# Patient Record
Sex: FEMALE | Race: WHITE | Employment: OTHER | URBAN - METROPOLITAN AREA
[De-identification: names, ages, dates, MRNs, and addresses within clinical notes are randomized per-mention and may not be internally consistent; named-entity substitution may affect disease eponyms.]

---

## 2018-05-04 ENCOUNTER — APPOINTMENT (OUTPATIENT)
Dept: PHYSICAL THERAPY | Age: 73
End: 2018-05-04

## 2018-05-09 ENCOUNTER — HOSPITAL ENCOUNTER (OUTPATIENT)
Dept: PHYSICAL THERAPY | Age: 73
Discharge: HOME | End: 2018-05-09
Payer: MEDICARE

## 2018-05-09 PROCEDURE — G8978 MOBILITY CURRENT STATUS: HCPCS | Performed by: PHYSICAL THERAPIST

## 2018-05-09 PROCEDURE — G8979 MOBILITY GOAL STATUS: HCPCS | Performed by: PHYSICAL THERAPIST

## 2018-05-09 PROCEDURE — 97161 PT EVAL LOW COMPLEX 20 MIN: CPT | Performed by: PHYSICAL THERAPIST

## 2018-05-09 NOTE — PROGRESS NOTES
..[facility] PHYSICAL THERAPY  [address] [place] [address], [place] [address],[facility], [address] [facility] - Phone: [phone]  Fax: 08-13-97-03 OF CARE / [address] [facility]  Patient Name: Andrea Roberts : 1945   Medical   Diagnosis: Back pain Treatment Diagnosis: Low back pain [M54.5]   Onset Date: x2 months     Referral Source: Linda Leon, * Start of Care [facility]): 2018   Prior Hospitalization: See medical history Provider #: [id number]   Prior Level of Function: No limitations related to back    Comorbidities: Arthritis, high blood pressure, scoliosis, thyroid problems   Medications: Verified on Patient Summary List   The Plan of Care and following information is based on the information from the initial evaluation.   ===========================================================================================  Assessment / key information:  67 F arrives to clinic with c/o R posterior hip pain radiating to anterior thigh. Reports insidious onset approximately 2 months ago that responded well to prednisone and muscle relaxer. Currently rates pain 8/10 at worst with walking and 0/10 with sitting. Denies red flags including bowel/bladder issues, night pain, etc. Recent xray show arthritis. Objective findings: ls arom ext, B sb limited 25% (R sided pain at end range), -slump, slr or SI cluster test B, -FADDIR and scower test of hip, L hip ir 4, R 18, L hip ext 9, R 22 reduced glute med and max strength B, unable to actively contract transverse abdominal or multifidi. pivm reduction in l1-5 pa, RR mobility with increased paraspinal and R QL tightness. Sx consistent with dx with likely l3/4/5 nerve involvement.  Will attempt PT in order to address problem list below in order to restore pt overall function.   ===========================================================================================  Eval Complexity: History MEDIUM Complexity : 1-2 comorbidities / personal factors will impact the outcome/ POC ;  Examination  MEDIUM Complexity : 3 Standardized tests and measures addressing body structure, function, activity limitation and / or participation in recreation ; Presentation LOW Complexity : Stable, uncomplicated ;  Decision Making MEDIUM Complexity : FOTO score of 26-74; Overall Complexity LOW   Problem List: pain affecting function, decrease ROM, decrease strength, impaired gait/ balance, decrease ADL/ functional abilitiies, decrease activity tolerance, decrease flexibility/ joint mobility and decrease transfer abilities   Treatment Plan may include any combination of the following: Therapeutic exercise, Therapeutic activities, Neuromuscular re-education, Physical agent/modality, Gait/balance training, Manual therapy, Patient education, Self Care training, Functional mobility training and Stair training  Patient / Family readiness to learn indicated by: asking questions, trying to perform skills and interest  Persons(s) to be included in education: patient (P)  Barriers to Learning/Limitations: None  Measures taken:    Patient Goal (s): Reduce pain, walk normal   Patient self reported health status: good  Rehabilitation Potential: good   Short Term Goals: To be accomplished in  2  weeks:  1. Pt will be compliant with hep  2. Pt will be able to actively contract transverse abdominal and multifidi in order to improve axial stability during adls   3. Pt will note daily max pain </=6/10 in order to increase participation in [employer]: To be accomplished in  4  weeks:  1. Pt will increase FOTO by 16 points in order to show functional improvement  2. Pt will ambulate community distances with symmetrical gait pattern  3.  Pt will be able to self manage sx in order to prepare for dc  Frequency / Duration:   Patient to be seen  2-3  times per week for 4  weeks:  Patient / Caregiver education and instruction: self care, activity modification and exercises  G-Codes (GP): Minor Ronde .Mobility [id number] Current  CK= 40-59%   Goal  CJ= 20-39%. The severity rating is based on the FOTO Score  Therapist Signature: Travis Julien DPT [facility]  Date: 0/2/9963   Certification Period: 5/9/18-8/7/18 Time: 12:51 PM   ===========================================================================================  I certify that the above Physical Therapy Services are being furnished while the patient is under my care. I agree with the treatment plan and certify that this therapy is necessary. Physician Signature:        Date:       Time:     Please sign and return to [facility], [facility]. or you may fax the signed copy to [phone]. Thank you.

## 2018-05-09 NOTE — PROGRESS NOTES
[facility] PHYSICAL THERAPY - DAILY TREATMENT NOTE    Patient Name: Marquis Armenta        Date: 2018  : 1945   yes Patient  Verified  Visit #:   1     Insurance: Payor: Alise Hough / Plan: [place] MEDICARE PART A & B / Product Type: Medicare /      In time: 1100 Out time: 1140   Total Treatment Time: 40     Medicare Time Tracking (below)   Total Timed Codes (min):  0 1:1 Treatment Time:  0     TREATMENT AREA =  Low back pain [M54.5]    SUBJECTIVE  Pain Level (on 0 to 10 scale):  0  / 10   Medication Changes/New allergies or changes in medical history, any new surgeries or procedures?    no  If yes, update Summary List   Subjective Functional Status/Changes:  []  No changes reported     See ie          OBJECTIVE           min Patient Education:  yes  Reviewed HEP   []  Progressed/Changed HEP based on: Other Objective/Functional Measures:    Demo hep without an increase in pain  See ie     Post Treatment Pain Level (on 0 to 10) scale:   0  / 10     ASSESSMENT  Assessment/Changes in Function:     See ie     []  See Progress Note/Recertification   Patient will continue to benefit from skilled PT services to modify and progress therapeutic interventions, address functional mobility deficits, address ROM deficits, address strength deficits, analyze and address soft tissue restrictions, analyze and cue movement patterns, analyze and modify body mechanics/ergonomics, assess and modify postural abnormalities and instruct in home and community integration to attain remaining goals.    Progress toward goals / Updated goals:    See ie     PLAN  []  Upgrade activities as tolerated yes Continue plan of care   []  Discharge due to :    []  Other:      Therapist: Padma Almazan PT    Date: 2018 Time: 12:50 PM     Future Appointments  Date Time Provider Yariel Ricci   5/10/2018 10:30 AM Padma Almazan PT [facility]   2018 11:00 AM Skyler Baer, ABDIRAHMAN [facility]   2018 11:00 AM Skyler Baer, PTA [facility]   5/21/2018 11:00 AM Ren Peralta, PT [facility]   5/24/2018 12:00 PM Ren Peralta, PT [facility]   5/29/2018 10:30 AM Suzan Gomez, PT [facility]   5/31/2018 11:30 AM Sergei Barnard, PTA [facility]   6/5/2018 11:00 AM Ren Peralta, PT [facility]   6/7/2018 10:30 AM Ren Peralta, PT [facility]

## 2018-05-10 ENCOUNTER — HOSPITAL ENCOUNTER (OUTPATIENT)
Dept: PHYSICAL THERAPY | Age: 73
Discharge: HOME | End: 2018-05-10
Payer: MEDICARE

## 2018-05-10 PROCEDURE — 97140 MANUAL THERAPY 1/> REGIONS: CPT | Performed by: PHYSICAL THERAPIST

## 2018-05-10 NOTE — PROGRESS NOTES
Shira Shah   PHYSICAL THERAPY - DAILY TREATMENT NOTE    Patient Name: Yakov Mehta        Date: 5/10/2018  : 1945   yes Patient  Verified  Visit #:   2     Insurance: Payor: VA MEDICARE / Plan: VA MEDICARE PART A & B / Product Type: Medicare /      In time: 6425 Out time: 8585   Total Treatment Time: 54     Medicare Time Tracking (below)   Total Timed Codes (min):  45 1:1 Treatment Time:  20     TREATMENT AREA =  Low back pain [M54.5]    SUBJECTIVE  Pain Level (on 0 to 10 scale):  4  / 10   Medication Changes/New allergies or changes in medical history, any new surgeries or procedures?    no  If yes, update Summary List   Subjective Functional Status/Changes:  []  No changes reported     I am just a little sore in my low back and I think that is from just moving in positions I have not done in a long time          OBJECTIVE  Modalities Rationale:     decrease pain and increase tissue extensibility to improve patient's ability to complete adls   min [] Estim, type/location:                                      []  att     []  unatt     []  w/US     []  w/ice    []  w/heat    min []  Mechanical Traction: type/lbs                   []  pro   []  sup   []  int   []  cont    []  before manual    []  after manual    min []  Ultrasound, settings/location:      min []  Iontophoresis w/ dexamethasone, location:                                               []  take home patch       []  in clinic   10 min []  Ice     [x]  Heat    location/position: Supine with bolster     min []  Vasopneumatic Device, press/temp:     min []  Other:    [x] Skin assessment post-treatment (if applicable):    []  intact    [x]  redness- no adverse reaction     []redness  adverse reaction:        25 min Therapeutic Exercise:  [x]  See flow sheet   Rationale:      increase ROM and increase strength to improve the patients ability to complete adls     20 min Manual Therapy: stm ls paraspinals, grade ii pa mobs l3-5, L hip ir/er stretch, prone quad stretch   Rationale:      decrease pain, increase ROM, increase tissue extensibility and decrease trigger points to improve patient's ability to complete adls         min Patient Education:  yes  Reviewed HEP   []  Progressed/Changed HEP based on: Other Objective/Functional Measures:  1:1 mt only  ttp along R ls paraspinals and QL  Required tcs to perform tra 3 way correctly without extremity compensation      Post Treatment Pain Level (on 0 to 10) scale:   1  / 10     ASSESSMENT  Assessment/Changes in Function:     No increase in pain following initial session     []  See Progress Note/Recertification   Patient will continue to benefit from skilled PT services to modify and progress therapeutic interventions, address functional mobility deficits, address ROM deficits, address strength deficits, analyze and address soft tissue restrictions, analyze and cue movement patterns, analyze and modify body mechanics/ergonomics, assess and modify postural abnormalities and instruct in home and community integration to attain remaining goals.    Progress toward goals / Updated goals:    Compliant with hep     PLAN  []  Upgrade activities as tolerated yes Continue plan of care   []  Discharge due to :    []  Other:      Therapist: Igor Rivera PT    Date: 5/10/2018 Time: 9:13 AM     Future Appointments  Date Time Provider Yariel Ricci   5/10/2018 10:30 AM Igor Rivera, PT [facility]   5/14/2018 11:00 AM Kedar Rodriguez PTA [facility]   5/16/2018 11:00 AM Kedar Rodriguez PTA [facility]   5/21/2018 11:00 AM Igor Rivera, PT [facility]   5/24/2018 12:00 PM Igor Rivera, PT [facility]   5/29/2018 10:30 AM Garcia Shin, PT [facility]   5/31/2018 11:30 AM Kedar Rodriguez PTA [facility]   6/5/2018 11:00 AM Igor Rivera, PT [facility]   6/7/2018 10:30 AM Igor Rivera, PT [facility]

## 2018-05-14 ENCOUNTER — APPOINTMENT (OUTPATIENT)
Dept: PHYSICAL THERAPY | Age: 73
End: 2018-05-14
Payer: MEDICARE

## 2018-05-15 ENCOUNTER — HOSPITAL ENCOUNTER (OUTPATIENT)
Dept: PHYSICAL THERAPY | Age: 73
Discharge: HOME | End: 2018-05-15
Payer: MEDICARE

## 2018-05-15 PROCEDURE — 97140 MANUAL THERAPY 1/> REGIONS: CPT | Performed by: PHYSICAL THERAPIST

## 2018-05-15 PROCEDURE — 97110 THERAPEUTIC EXERCISES: CPT | Performed by: PHYSICAL THERAPIST

## 2018-05-15 NOTE — PROGRESS NOTES
Melanie Rajan PHYSICAL THERAPY - DAILY TREATMENT NOTE    Patient Name: Imer Blood        Date: 5/15/2018  : 1945   yes Patient  Verified  Visit #:   3     Insurance: Payor: Mckinley Fraction / Plan: [place] MEDICARE PART A & B / Product Type: Medicare /      In time: 5378 Out time: 8233   Total Treatment Time: 43     Medicare Time Tracking (below)   Total Timed Codes (min):  43 1:1 Treatment Time:  43     TREATMENT AREA =  Low back pain [M54.5]    SUBJECTIVE  Pain Level (on 0 to 10 scale):  2-3  / 10   Medication Changes/New allergies or changes in medical history, any new surgeries or procedures?    no  If yes, update Summary List   Subjective Functional Status/Changes:  []  No changes reported     So this is a weird but my thigh pain is not there but I am starting to feel more back pain. I am also feeling some discomfort with that one stretch where I rock my knees side to side          OBJECTIVE      28 min Therapeutic Exercise:  [x]  See flow sheet   Rationale:      increase ROM and increase strength to improve the patients ability to complete adls     15 min Manual Therapy: stm ls paraspinals, grade ii pa mobs l3-5, R hip ir/er stretch, prone quad stretch, sacral L rot   Rationale:      decrease pain, increase ROM, increase tissue extensibility and decrease trigger points to improve patient's ability to complete adls         min Patient Education:  yes  Reviewed HEP   []  Progressed/Changed HEP based on:        Other Objective/Functional Measures:     Pt performing ltg with excessive range on floor - advised to modify range and perform on bed and demo in clinic without pain  Reviewed at length that reduced thigh pain could be peripheralization and increased in back pain could be contributed to increase te and mt to low back area    Post Treatment Pain Level (on 0 to 10) scale:   1  / 10     ASSESSMENT  Assessment/Changes in Function:     Continues with reduce R lumbosacral hypomobility with increase soft tissue restrictions in corresponding area. []  See Progress Note/Recertification   Patient will continue to benefit from skilled PT services to modify and progress therapeutic interventions, address functional mobility deficits, address ROM deficits, address strength deficits, analyze and address soft tissue restrictions, analyze and cue movement patterns, analyze and modify body mechanics/ergonomics, assess and modify postural abnormalities and instruct in home and community integration to attain remaining goals.    Progress toward goals / Updated goals:    Improved LE resolution with s/s      PLAN  []  Upgrade activities as tolerated yes Continue plan of care   []  Discharge due to :    []  Other:      Therapist: Gemma Alcala PT    Date: 5/15/2018 Time: 9:13 AM     Future Appointments  Date Time Provider Yariel Ricci   5/15/2018 12:00 PM Gemma Alcala PT [facility]   5/17/2018 3:00 PM Gemma Alcala, PT [facility]   5/21/2018 11:00 AM Gemma Alcala PT [facility]   5/24/2018 12:00 PM Gemma Alcala PT [facility]   5/29/2018 2:30 PM Gemma Alcala, PT [facility]   5/31/2018 2:30 PM Gemma Alcala PT [facility]   6/5/2018 11:00 AM Gemma Alcala PT [facility]   6/7/2018 10:30 AM Gemma Alcala, PT [facility]

## 2018-05-16 ENCOUNTER — APPOINTMENT (OUTPATIENT)
Dept: PHYSICAL THERAPY | Age: 73
End: 2018-05-16
Payer: MEDICARE

## 2018-05-17 ENCOUNTER — HOSPITAL ENCOUNTER (OUTPATIENT)
Dept: PHYSICAL THERAPY | Age: 73
Discharge: HOME | End: 2018-05-17
Payer: MEDICARE

## 2018-05-17 PROCEDURE — 97110 THERAPEUTIC EXERCISES: CPT | Performed by: PHYSICAL THERAPIST

## 2018-05-17 PROCEDURE — 97140 MANUAL THERAPY 1/> REGIONS: CPT | Performed by: PHYSICAL THERAPIST

## 2018-05-17 NOTE — PROGRESS NOTES
[facility] Lopez PHYSICAL THERAPY - DAILY TREATMENT NOTE    Patient Name: Sydney Almazan        Date: 2018  : 1945   yes Patient  Verified  Visit #:   4     Insurance: Payor: Hermes Villalobos / Plan: VA MEDICARE PART A & B / Product Type: Medicare /      In time: 302 Out time: 339   Total Treatment Time: 37     Medicare Time Tracking (below)   Total Timed Codes (min):  37 1:1 Treatment Time:  28     TREATMENT AREA =  Low back pain [M54.5]    SUBJECTIVE  Pain Level (on 0 to 10 scale):  2 / 10   Medication Changes/New allergies or changes in medical history, any new surgeries or procedures?    no  If yes, update Summary List   Subjective Functional Status/Changes:  []  No changes reported     After last therapy session I had no pain until this morning - in fact I did not even limp at all. Today its just in the hip and buttock area nothing below the knee. I did the exercises in the bed and they went much better      OBJECTIVE      25 min Therapeutic Exercise:  [x]  See flow sheet   Rationale:      increase ROM and increase strength to improve the patients ability to complete adls     12 min Manual Therapy: stm ls paraspinals, grade ii pa mobs l3-5, R hip ir/er stretch, prone quad stretch, sacral L rot   Rationale:      decrease pain, increase ROM, increase tissue extensibility and decrease trigger points to improve patient's ability to complete adls         min Patient Education:  yes  Reviewed HEP   []  Progressed/Changed HEP based on:        Other Objective/Functional Measures:  1:1 302-330   Able to perform all rotational te without c/o pain then attempted sit<>prone with L rotation and noted R sided discomfort  Symmetrical sij  Reduced pa mobility upper lumbar     Post Treatment Pain Level (on 0 to 10) scale:   0/ 10     ASSESSMENT  Assessment/Changes in Function:     Left clinic with no pain for the first time since episode began    []  See Progress Note/Recertification   Patient will continue to benefit from skilled PT services to modify and progress therapeutic interventions, address functional mobility deficits, address ROM deficits, address strength deficits, analyze and address soft tissue restrictions, analyze and cue movement patterns, analyze and modify body mechanics/ergonomics, assess and modify postural abnormalities and instruct in home and community integration to attain remaining goals.    Progress toward goals / Updated goals:    Steady progress towards all established goals      PLAN  []  Upgrade activities as tolerated yes Continue plan of care   []  Discharge due to :    []  Other:      Therapist: Gemma Alcala PT    Date: 5/17/2018 Time: 9:13 AM     Future Appointments  Date Time Provider Yariel Ricci   5/17/2018 3:00 PM Gemma Alcala PT [facility]   5/21/2018 11:00 AM Gemma Alcala PT [facility]   5/24/2018 12:00 PM Gemma Alcala PT [facility]   5/29/2018 2:30 PM Gemma Alcala PT [facility]   5/31/2018 2:30 PM Gemma Alcala PT [facility]   6/5/2018 11:00 AM Gemma Alcala PT [facility]   6/7/2018 10:30 AM Gemma Alcala PT [facility]

## 2018-05-21 ENCOUNTER — HOSPITAL ENCOUNTER (OUTPATIENT)
Dept: PHYSICAL THERAPY | Age: 73
Discharge: HOME | End: 2018-05-21
Payer: MEDICARE

## 2018-05-21 PROCEDURE — 97110 THERAPEUTIC EXERCISES: CPT | Performed by: PHYSICAL THERAPIST

## 2018-05-21 PROCEDURE — 97140 MANUAL THERAPY 1/> REGIONS: CPT | Performed by: PHYSICAL THERAPIST

## 2018-05-21 NOTE — PROGRESS NOTES
Charissa Eddy PHYSICAL THERAPY - DAILY TREATMENT NOTE    Patient Name: Kandy Fabry        Date: 2018  : 1945   yes Patient  Verified  Visit #:   5    Insurance: Payor: Nga Arriola / Plan: VA MEDICARE PART A & B / Product Type: Medicare /      In time:  Out time: 3803   Total Treatment Time: 52     Medicare Time Tracking (below)   Total Timed Codes (min):  52 1:1 Treatment Time:  52     TREATMENT AREA =  Low back pain [M54.5]    SUBJECTIVE  Pain Level (on 0 to 10 scale):  1 / 10   Medication Changes/New allergies or changes in medical history, any new surgeries or procedures? yes  If yes, update Summary List   Subjective Functional Status/Changes:  []  No changes reported     I got put on some prednisone to help with my seasonal allergies and I think that it helped my back because I have been able to lay on that side past couple of nights      OBJECTIVE      35 min Therapeutic Exercise:  [x]  See flow sheet   Rationale:      increase ROM and increase strength to improve the patients ability to complete adls     17 min Manual Therapy: stm ls paraspinals, grade ii pa mobs l3-5, B hip ir/er stretch, ant L hip mob, ls lamina release    Rationale:      decrease pain, increase ROM, increase tissue extensibility and decrease trigger points to improve patient's ability to complete adls         min Patient Education:  yes  Reviewed HEP   []  Progressed/Changed HEP based on:        Other Objective/Functional Measures:    Progressed te as per flow sheet to improve hip mobility and core strength - able to perform bed transfers this session without progressive pain but unable to perform any single leg on R without c/o 1-2/10 anterior thigh pain    Post Treatment Pain Level (on 0 to 10) scale:   0/ 10     ASSESSMENT  Assessment/Changes in Function:   Still able to reproduce pt thigh pain with lateral compression of lumbar spine - discussed at length avoiding these positions and activities for improved carry over at home      []  See Progress Note/Recertification   Patient will continue to benefit from skilled PT services to modify and progress therapeutic interventions, address functional mobility deficits, address ROM deficits, address strength deficits, analyze and address soft tissue restrictions, analyze and cue movement patterns, analyze and modify body mechanics/ergonomics, assess and modify postural abnormalities and instruct in home and community integration to attain remaining goals. Progress toward goals / Updated goals:     Will perform foto next session in order to assess progress towards established goal      PLAN  []  Upgrade activities as tolerated yes Continue plan of care   []  Discharge due to :    []  Other:      Therapist: Ren Peralta PT    Date: 5/21/2018 Time: 9:13 AM     Future Appointments  Date Time Provider Yariel Ricci   5/21/2018 11:00 AM Ren Peralta PT [facility]   5/24/2018 12:00 PM Ren Peralta PT [facility]   5/29/2018 2:30 PM Ren Peralta PT [facility]   5/31/2018 2:30 PM Ren Peralta PT [facility]   6/5/2018 11:00 AM Ren Peralta PT [facility]   6/7/2018 10:30 AM Ren Peralta PT [facility]

## 2018-05-24 ENCOUNTER — HOSPITAL ENCOUNTER (OUTPATIENT)
Dept: PHYSICAL THERAPY | Age: 73
Discharge: HOME | End: 2018-05-24
Payer: MEDICARE

## 2018-05-24 PROCEDURE — 97110 THERAPEUTIC EXERCISES: CPT | Performed by: PHYSICAL THERAPIST

## 2018-05-24 PROCEDURE — 97140 MANUAL THERAPY 1/> REGIONS: CPT | Performed by: PHYSICAL THERAPIST

## 2018-05-24 NOTE — PROGRESS NOTES
[facility] Lopez PHYSICAL THERAPY - DAILY TREATMENT NOTE    Patient Name: Sydney Almazan        Date: 2018  : 1945   yes Patient  Verified  Visit #:   6    Insurance: Payor: Hermes Villalobos / Plan: VA MEDICARE PART A & B / Product Type: Medicare /      In time: 1200 Out time: 1250   Total Treatment Time: 50     Medicare Time Tracking (below)   Total Timed Codes (min):  50 1:1 Treatment Time:  50     TREATMENT AREA =  Low back pain [M54.5]    SUBJECTIVE  Pain Level (on 0 to 10 scale):  1 / 10   Medication Changes/New allergies or changes in medical history, any new surgeries or procedures? yes  If yes, update Summary List   Subjective Functional Status/Changes:  []  No changes reported     I am not sure if it is the prednisone or what but I have less pain      OBJECTIVE      35 min Therapeutic Exercise:  [x]  See flow sheet   Rationale:      increase ROM and increase strength to improve the patients ability to complete adls     15 min Manual Therapy: stm ls paraspinals, grade ii pa mobs l3-5, B hip ir/er stretch, ant L hip mob, ls lamina release, leg pull in prone    Rationale:      decrease pain, increase ROM, increase tissue extensibility and decrease trigger points to improve patient's ability to complete adls         min Patient Education:  yes  Reviewed HEP   []  Progressed/Changed HEP based on:        Other Objective/Functional Measures:  Pt had difficulty determining which part of stairs increased thigh pain, taken to clinic stairs and negotiated 14 steps with reciprocal gait pattern and noted 1/10 pain during stance phase of R during ascending that disappeared after rest  Progressed te and hep as per flow sheet without c/o    Post Treatment Pain Level (on 0 to 10) scale:   0/ 10     ASSESSMENT  Assessment/Changes in Function:   Chief c/o anterior thigh pain continues to be reproduced by unilateral stance phase of ambulation    []  See Progress Note/Recertification   Patient will continue to benefit from skilled PT services to modify and progress therapeutic interventions, address functional mobility deficits, address ROM deficits, address strength deficits, analyze and address soft tissue restrictions, analyze and cue movement patterns, analyze and modify body mechanics/ergonomics, assess and modify postural abnormalities and instruct in home and community integration to attain remaining goals.    Progress toward goals / Updated goals:    Time constraint and therefore unable to perform functional scale      PLAN  []  Upgrade activities as tolerated yes Continue plan of care   []  Discharge due to :    []  Other:      Therapist: Luan Hensley PT    Date: 5/24/2018 Time: 9:13 AM     Future Appointments  Date Time Provider Yariel Ricci   5/29/2018 2:30 PM Luan Hensley PT [facility]   5/31/2018 2:30 PM Luan Hensley, PT [facility]   6/5/2018 11:00 AM Luan Hensley, PT [facility]   6/7/2018 10:30 AM Luan Hensley, PT [facility]

## 2018-05-29 ENCOUNTER — HOSPITAL ENCOUNTER (OUTPATIENT)
Dept: PHYSICAL THERAPY | Age: 73
Discharge: HOME | End: 2018-05-29
Payer: MEDICARE

## 2018-05-29 PROCEDURE — 97110 THERAPEUTIC EXERCISES: CPT | Performed by: PHYSICAL THERAPIST

## 2018-05-29 PROCEDURE — 97140 MANUAL THERAPY 1/> REGIONS: CPT | Performed by: PHYSICAL THERAPIST

## 2018-05-29 NOTE — PROGRESS NOTES
[facility] PHYSICAL THERAPY - DAILY TREATMENT NOTE    Patient Name: Britt Gu        Date: 2018  : 1945   yes Patient  Verified  Visit #:     Insurance: Payor: Dante House / Plan: [place] MEDICARE PART A & B / Product Type: Medicare /      In time: 230 Out time: 326   Total Treatment Time: 54     Medicare Time Tracking (below)   Total Timed Codes (min):55 1:1 Treatment Time:  55     TREATMENT AREA =  Low back pain [M54.5]    SUBJECTIVE  Pain Level (on 0 to 10 scale):  0 / 10   Medication Changes/New allergies or changes in medical history, any new surgeries or procedures? yes  If yes, update Summary List   Subjective Functional Status/Changes:  []  No changes reported     I had some pain below my calf earlier today but overall it is feeling okay. My hip does not have any pain      OBJECTIVE      40 min Therapeutic Exercise:  [x]  See flow sheet   Rationale:      increase ROM and increase strength to improve the patients ability to complete adls     15 min Manual Therapy: stm ls paraspinals, grade ii pa mobs l3-5, B hip ir/er and rf stretch, ant L hip mob, ls lamina release, leg pull in prone    Rationale:      decrease pain, increase ROM, increase tissue extensibility and decrease trigger points to improve patient's ability to complete adls         min Patient Education:  yes  Reviewed HEP   []  Progressed/Changed HEP based on: Other Objective/Functional Measures:  Continues to report R LE sx distal to knee with single leg stance activities such as ascending stairs and community ambulation - continues with trendelenburg gait and noted gm weakness with all te   Post Treatment Pain Level (on 0 to 10) scale:   0/ 10     ASSESSMENT  Assessment/Changes in Function:   No increase in pain following the session. Unable to reproduce LE sx distal to knee this session.     []  See Progress Note/Recertification   Patient will continue to benefit from skilled PT services to modify and progress therapeutic interventions, address functional mobility deficits, address ROM deficits, address strength deficits, analyze and address soft tissue restrictions, analyze and cue movement patterns, analyze and modify body mechanics/ergonomics, assess and modify postural abnormalities and instruct in home and community integration to attain remaining goals.    Progress toward goals / Updated goals:    FOTO increased 13 points      PLAN  []  Upgrade activities as tolerated yes Continue plan of care   []  Discharge due to :    []  Other:      Therapist: Bebe Skelton PT    Date: 5/29/2018 Time: 9:13 AM     Future Appointments  Date Time Provider Yariel Ricci   5/29/2018 2:30 PM Bebe Skelton PT [facility]   5/31/2018 2:30 PM Bebe Skelton PT [facility]   6/5/2018 11:00 AM Bebe Skelton PT [facility]   6/7/2018 10:30 AM Bebe Skelton, PT [facility]

## 2018-05-31 ENCOUNTER — HOSPITAL ENCOUNTER (OUTPATIENT)
Dept: PHYSICAL THERAPY | Age: 73
Discharge: HOME | End: 2018-05-31
Payer: MEDICARE

## 2018-05-31 PROCEDURE — 97140 MANUAL THERAPY 1/> REGIONS: CPT | Performed by: PHYSICAL THERAPIST

## 2018-05-31 PROCEDURE — 97110 THERAPEUTIC EXERCISES: CPT | Performed by: PHYSICAL THERAPIST

## 2018-05-31 NOTE — PROGRESS NOTES
[facility] PHYSICAL THERAPY - DAILY TREATMENT NOTE    Patient Name: Lacey Ni        Date: 2018  : 1945   yes Patient  Verified  Visit #:     Insurance: Payor: Mimi Walls / Plan: [place] MEDICARE PART A & B / Product Type: Medicare /      In time: 230 Out time: 326   Total Treatment Time: 54     Medicare Time Tracking (below)   Total Timed Codes (min):55 1:1 Treatment Time:  55     TREATMENT AREA =  Low back pain [M54.5]    SUBJECTIVE  Pain Level (on 0 to 10 scale):  1 / 10   Medication Changes/New allergies or changes in medical history, any new surgeries or procedures? yes  If yes, update Summary List   Subjective Functional Status/Changes:  []  No changes reported     Not feeling too bad      OBJECTIVE      40 min Therapeutic Exercise:  [x]  See flow sheet   Rationale:      increase ROM and increase strength to improve the patients ability to complete adls     15 min Manual Therapy: stm ls paraspinals, grade ii pa mobs l3-5, B hip ir/er and rf stretch, ant L hip mob, R QL release, leg pull in prone    Rationale:      decrease pain, increase ROM, increase tissue extensibility and decrease trigger points to improve patient's ability to complete adls         min Patient Education:  yes  Reviewed HEP   []  Progressed/Changed HEP based on:        Other Objective/Functional Measures:  Noted tenderness at R QL and l3 tp but denied any thigh sx with palpation   No increase in pain following session    Post Treatment Pain Level (on 0 to 10) scale:   0/ 10     ASSESSMENT  Assessment/Changes in Function:   Reports ability to climb stairs last 48 hours without pain    []  See Progress Note/Recertification   Patient will continue to benefit from skilled PT services to modify and progress therapeutic interventions, address functional mobility deficits, address ROM deficits, address strength deficits, analyze and address soft tissue restrictions, analyze and cue movement patterns, analyze and modify body mechanics/ergonomics, assess and modify postural abnormalities and instruct in home and community integration to attain remaining goals.    Progress toward goals / Updated goals:    Pt will likely discharge next week as approaching self management of sx     PLAN  []  Upgrade activities as tolerated yes Continue plan of care   []  Discharge due to :    []  Other:      Therapist: Nasima Yost, PT    Date: 5/31/2018 Time: 9:13 AM     Future Appointments  Date Time Provider Yariel Ricci   6/5/2018 11:00 AM Nasima Yost, PT [facility]   6/7/2018 10:30 AM Nasima Yost, PT [facility]

## 2018-06-05 ENCOUNTER — HOSPITAL ENCOUNTER (OUTPATIENT)
Dept: PHYSICAL THERAPY | Age: 73
Discharge: HOME | End: 2018-06-05
Payer: MEDICARE

## 2018-06-05 PROCEDURE — G8978 MOBILITY CURRENT STATUS: HCPCS | Performed by: PHYSICAL THERAPIST

## 2018-06-05 PROCEDURE — 97140 MANUAL THERAPY 1/> REGIONS: CPT | Performed by: PHYSICAL THERAPIST

## 2018-06-05 PROCEDURE — G8979 MOBILITY GOAL STATUS: HCPCS | Performed by: PHYSICAL THERAPIST

## 2018-06-05 PROCEDURE — 97110 THERAPEUTIC EXERCISES: CPT | Performed by: PHYSICAL THERAPIST

## 2018-06-05 NOTE — PROGRESS NOTES
[facility]HEL[facility] PHYSICAL THERAPY   [facility], [address],[facility], [facility] - Phone: [phone]  Fax: [phone]  [address] [place] [address] PHYSICAL THERAPY          Patient Name: Lacey Ni : 1945   Treatment/Medical Diagnosis: Low back pain [M54.5]   Onset Date: x2 months    Referral Source: Michelle Coley, * Start of Care [facility]): 18   Prior Hospitalization: See Medical History Provider #: [id number]   Prior Level of Function: No limitations related to back    Comorbidities: Arthritis, high blood pressure, scoliosis, thyroid problems   Medications: Verified on Patient Summary List   [facility] Missed Visits: 0     Goal/Measure of Progress Goal Met? 1. Pt will increase FOTO by 16 points in order to show functional improvement    Status at last Eval: 45/100 Current Status: 58/100 progressing   2. Pt will ambulate community distances with symmetrical gait pattern    Status at last Eval: Antalgic gait Current Status: Community distances trendelenburg progressing   3. Pt will be able to self manage sx in order to prepare for dc   Status at last Eval: na Current Status: Still requires cues progressing     Key Functional Changes/Progress: pt has made excellent progress with PT thus far. Reports 80% overall improvement with LE sx. She still continues to have LE sx distal to knee approximately 1x/day at 4/10 max compared to 8/10 at IE.  Her l/s arom has improved wfl all directions but still continues with instability and decreased activation of multifidi and transverse abdominal.   Problem List: pain affecting function, decrease ROM, decrease strength, impaired gait/ balance, decrease ADL/ functional abilitiies, decrease activity tolerance, decrease flexibility/ joint mobility and decrease transfer abilities   Treatment Plan may include any combination of the following: Therapeutic exercise, Therapeutic activities, Neuromuscular re-education, Physical agent/modality, Gait/balance training, Manual therapy, Patient education, Self Care training, Functional mobility training, Home safety training and Stair training  Patient Goal(s) has been updated and includes:      Goals for this certification period include and are to be achieved in   3  weeks:  Continue as above  Frequency / Duration:   Patient to be seen   2   times per week for   3    weeks:  G-Codes (GP): Charissa Eddy .Mobility [id number] Current  CK= 40-59%   Goal  CJ= 20-39%. The severity rating is based on the FOTO Score    Assessments/Recommendations: continue therapy an additional 2x/3 weeks with emphasis on functional mobility and strengthening   If you have any questions/comments please contact us directly at [phone]. Thank you for allowing us to assist in the care of your patient. Therapist Signature: Raffaele Sousa PT Date: 8/4/6580   Certification Period:  Reporting Period: 5/9/18-8/7/18 5/9/18-6/5/18 Time: 12:54 PM   NOTE TO PHYSICIAN:  PLEASE COMPLETE THE ORDERS BELOW AND FAX TO   [facility] Physical Therapy: [phone]  If you are unable to process this request in 24 hours please contact our office: [phone]    ___ I have read the above report and request that my patient continue as recommended.   ___ I have read the above report and request that my patient continue therapy with the following changes/special instructions: ________________________________________________   ___ I have read the above report and request that my patient be discharged from therapy.      Physician Signature:        Date:       Time:

## 2018-06-05 NOTE — PROGRESS NOTES
Robert [facility] PHYSICAL THERAPY - DAILY TREATMENT NOTE    Patient Name: Anish Campos        Date: 2018  : 1945   yes Patient  Verified  Visit #:     Insurance: Payor: Jhoana Edward / Plan: [place] MEDICARE PART A & B / Product Type: Medicare /      In time: 1597 Out time: 1222   Total Treatment Time: 55     Medicare Time Tracking (below)   Total Timed Codes (min):55 1:1 Treatment Time:  55     TREATMENT AREA =  Low back pain [M54.5]    SUBJECTIVE  Pain Level (on 0 to 10 scale):  1 / 10   Medication Changes/New allergies or changes in medical history, any new surgeries or procedures? yes  If yes, update Summary List   Subjective Functional Status/Changes:  []  No changes reported     See pn     OBJECTIVE      40 min Therapeutic Exercise:  [x]  See flow sheet   Rationale:      increase ROM and increase strength to improve the patients ability to complete adls     15 min Manual Therapy: stm ls paraspinals, grade ii-iii pa mobs l3-5, B hip ir/er and rf stretch, ant L hip mob, R QL release,    Rationale:      decrease pain, increase ROM, increase tissue extensibility and decrease trigger points to improve patient's ability to complete adls         min Patient Education:  yes  Reviewed HEP   []  Progressed/Changed HEP based on: Other Objective/Functional Measures:  See pn   Post Treatment Pain Level (on 0 to 10) scale:   0/ 10     ASSESSMENT  Assessment/Changes in Function:   See pn   []  See Progress Note/Recertification   Patient will continue to benefit from skilled PT services to modify and progress therapeutic interventions, address functional mobility deficits, address ROM deficits, address strength deficits, analyze and address soft tissue restrictions, analyze and cue movement patterns, analyze and modify body mechanics/ergonomics, assess and modify postural abnormalities and instruct in home and community integration to attain remaining goals.    Progress toward goals / Updated goals:    See pn     PLAN  []  Upgrade activities as tolerated yes Continue plan of care   []  Discharge due to :    []  Other:      Therapist: Tawanna Dias PT    Date: 6/5/2018 Time: 9:13 AM     Future Appointments  Date Time Provider Yariel Ricci   6/7/2018 10:30 AM Tawanna Dias PT [facility]   6/11/2018 10:30 AM Tawanna Dias PT [facility]   6/14/2018 9:30 AM Tawanna Disa PT [facility]   6/19/2018 12:00 PM Tawanna Dias PT [facility]   6/21/2018 2:00 PM Tawanna Dias PT [facility]   6/26/2018 10:30 AM Tawanna Dias PT [facility]   6/28/2018 10:30 AM Tawanna Dias PT [facility]

## 2018-06-07 ENCOUNTER — HOSPITAL ENCOUNTER (OUTPATIENT)
Dept: PHYSICAL THERAPY | Age: 73
Discharge: HOME | End: 2018-06-07
Payer: MEDICARE

## 2018-06-07 PROCEDURE — 97110 THERAPEUTIC EXERCISES: CPT | Performed by: PHYSICAL THERAPIST

## 2018-06-07 PROCEDURE — 97140 MANUAL THERAPY 1/> REGIONS: CPT | Performed by: PHYSICAL THERAPIST

## 2018-06-07 NOTE — PROGRESS NOTES
Wendy Winn PHYSICAL THERAPY - DAILY TREATMENT NOTE    Patient Name: Cabrera Grover        Date: 2018  : 1945   yes Patient  Verified  Visit #:   10  of   8-12  Insurance: Payor: Ernst Varela / Plan: [place] MEDICARE PART A & B / Product Type: Medicare /      In time:  Out time: 2773   Total Treatment Time: 45     Medicare Time Tracking (below)   Total Timed Codes (min):45 1:1 Treatment Time:  45     TREATMENT AREA =  Low back pain [M54.5]    SUBJECTIVE  Pain Level (on 0 to 10 scale):  1 / 10   Medication Changes/New allergies or changes in medical history, any new surgeries or procedures? yes  If yes, update Summary List   Subjective Functional Status/Changes:  []  No changes reported     I had one instance of pain after last session on my outside shin other than that it bas been pretty good      OBJECTIVE      30 min Therapeutic Exercise:  [x]  See flow sheet   Rationale:      increase ROM and increase strength to improve the patients ability to complete adls     15 min Manual Therapy: stm ls paraspinals, grade ii-iii pa mobs l3-5, B hip ir/er and rf stretch, ant L hip mob, R QL release,    Rationale:      decrease pain, increase ROM, increase tissue extensibility and decrease trigger points to improve patient's ability to complete adls         min Patient Education:  yes  Reviewed HEP   []  Progressed/Changed HEP based on:        Other Objective/Functional Measures:  Reduced pain and muscular restrictions with palpation to R lumbar paraspinals  Required use of pillow to perform prone hip ext without ls rotation compensation    Post Treatment Pain Level (on 0 to 10) scale:   1/ 10     ASSESSMENT  Assessment/Changes in Function:   Denied increase in s/s at end of session    []  See Progress Note/Recertification   Patient will continue to benefit from skilled PT services to modify and progress therapeutic interventions, address functional mobility deficits, address ROM deficits, address strength deficits, analyze and address soft tissue restrictions, analyze and cue movement patterns, analyze and modify body mechanics/ergonomics, assess and modify postural abnormalities and instruct in home and community integration to attain remaining goals.    Progress toward goals / Updated goals:    Advanced hep to improve stability during standing      PLAN  []  Upgrade activities as tolerated yes Continue plan of care   []  Discharge due to :    []  Other:      Therapist: Rhianna Tobar, PT    Date: 6/7/2018 Time: 9:13 AM     Future Appointments  Date Time Provider Yariel Ricci   6/7/2018 10:30 AM Rhianna Tobar, PT [facility]   6/11/2018 10:30 AM Rhianna Tobar, PT [facility]   6/14/2018 9:30 AM Rhianna Tobar, PT [facility]   6/19/2018 12:00 PM Rhianna Tobar, PT [facility]   6/21/2018 2:00 PM Rhianna Tobar, PT [facility]   6/26/2018 10:30 AM Rhianna Toabr, PT [facility]   6/28/2018 10:30 AM Rhianna Tobar, PT [facility]

## 2018-06-11 ENCOUNTER — HOSPITAL ENCOUNTER (OUTPATIENT)
Dept: PHYSICAL THERAPY | Age: 73
Discharge: HOME | End: 2018-06-11
Payer: MEDICARE

## 2018-06-11 PROCEDURE — 97110 THERAPEUTIC EXERCISES: CPT | Performed by: PHYSICAL THERAPIST

## 2018-06-11 PROCEDURE — 97140 MANUAL THERAPY 1/> REGIONS: CPT | Performed by: PHYSICAL THERAPIST

## 2018-06-11 NOTE — PROGRESS NOTES
Zak Owen PHYSICAL THERAPY - DAILY TREATMENT NOTE    Patient Name: Ara Soto        Date: 2018  : 1945   yes Patient  Verified  Visit #:    Insurance: Payor: Melany Kimbrough / Plan: VA MEDICARE PART A & B / Product Type: Medicare /      In time: 7839 Out time: 1140   Total Treatment Time: 62     Medicare Time Tracking (below)   Total Timed Codes (min):57 1:1 Treatment Time:  37     TREATMENT AREA =  Low back pain [M54.5]    SUBJECTIVE  Pain Level (on 0 to 10 scale):  1 / 10   Medication Changes/New allergies or changes in medical history, any new surgeries or procedures? yes  If yes, update Summary List   Subjective Functional Status/Changes:  []  No changes reported     I had to go to a birthday party over the weekend and stand for like 3 hours. My back bothered me but not my leg. I was able to walk up the stairs without pain. I did not do my exercises like I shoulder      OBJECTIVE      30 min Therapeutic Exercise:  [x]  See flow sheet   Rationale:      increase ROM and increase strength to improve the patients ability to complete adls     15 min Manual Therapy: stm ls paraspinals, grade ii-iii pa mobs l3-5, B hip ir/er and rf stretch, R QL release   Rationale:      decrease pain, increase ROM, increase tissue extensibility and decrease trigger points to improve patient's ability to complete adls         min Patient Education:  yes  Reviewed HEP   []  Progressed/Changed HEP based on:        Other Objective/Functional Measures:  1:1 [id number]  ttp along R l1-3 paraspinals otherwise unremarkable findings with mt  Progressed te as per flow sheet without c/o pain   Post Treatment Pain Level (on 0 to 10) scale:   1/ 10     ASSESSMENT  Assessment/Changes in Function:   Denied any LE sx since last session    []  See Progress Note/Recertification   Patient will continue to benefit from skilled PT services to modify and progress therapeutic interventions, address functional mobility deficits, address ROM deficits, address strength deficits, analyze and address soft tissue restrictions, analyze and cue movement patterns, analyze and modify body mechanics/ergonomics, assess and modify postural abnormalities and instruct in home and community integration to attain remaining goals. Progress toward goals / Updated goals:   Will perform FOTO next session in order to assess progress towards goal        PLAN  []  Upgrade activities as tolerated yes Continue plan of care   []  Discharge due to :    []  Other:      Therapist: Rhianna Tobar, PT    Date: 6/11/2018 Time: 9:13 AM     Future Appointments  Date Time Provider Yariel Ricci   6/11/2018 10:30 AM Rhianna Tobar, PT [facility]   6/14/2018 9:30 AM Rhianna Tobar, PT [facility]   6/19/2018 12:00 PM Rhianna Tobar, PT [facility]   6/21/2018 2:00 PM Rhianna Tobar, PT [facility]   6/26/2018 10:30 AM Rhianna Tobar, PT [facility]   6/28/2018 10:30 AM Rhianna Tobar, PT [facility]

## 2018-06-14 ENCOUNTER — HOSPITAL ENCOUNTER (OUTPATIENT)
Dept: PHYSICAL THERAPY | Age: 73
Discharge: HOME | End: 2018-06-14
Payer: MEDICARE

## 2018-06-14 PROCEDURE — 97110 THERAPEUTIC EXERCISES: CPT | Performed by: PHYSICAL THERAPIST

## 2018-06-14 PROCEDURE — 97140 MANUAL THERAPY 1/> REGIONS: CPT | Performed by: PHYSICAL THERAPIST

## 2018-06-14 NOTE — PROGRESS NOTES
[facility]os V[facility]quez PHYSICAL THERAPY - DAILY TREATMENT NOTE    Patient Name: Levi Martinez        Date: 2018  : 1945   yes Patient  Verified  Visit #:    Insurance: Payor: Amy Root / Plan: VA MEDICARE PART A & B / Product Type: Medicare /      In time: 930 Out time: 4481   Total Treatment Time: 60     Medicare Time Tracking (below)   Total Timed Codes (min):60 1:1 Treatment Time:  60     TREATMENT AREA =  Low back pain [M54.5]    SUBJECTIVE  Pain Level (on 0 to 10 scale):  2 / 10   Medication Changes/New allergies or changes in medical history, any new surgeries or procedures? yes  If yes, update Summary List   Subjective Functional Status/Changes:  []  No changes reported     I have some discomfort in other body parts besides my back and leg. I have actually had only minor twinges since I was last in     OBJECTIVE      40 min Therapeutic Exercise:  [x]  See flow sheet   Rationale:      increase ROM and increase strength to improve the patients ability to complete adls     20 min Manual Therapy: stm ls paraspinals, grade ii-iii pa mobs l3-5, B hip ir/er and rf stretch   Rationale:      decrease pain, increase ROM, increase tissue extensibility and decrease trigger points to improve patient's ability to complete adls         min Patient Education:  yes  Reviewed HEP   []  Progressed/Changed HEP based on:        Other Objective/Functional Measures:  No muscular restrictions noted along QL but ttp along l2/3 paraspinal  Hip prom denied pain at end ranges  Added in te as per flow sheet to improve wb core stabilization   Post Treatment Pain Level (on 0 to 10) scale:   0/ 10     ASSESSMENT  Assessment/Changes in Function:   Left the session with no pain    []  See Progress Note/Recertification   Patient will continue to benefit from skilled PT services to modify and progress therapeutic interventions, address functional mobility deficits, address ROM deficits, address strength deficits, analyze and address soft tissue restrictions, analyze and cue movement patterns, analyze and modify body mechanics/ergonomics, assess and modify postural abnormalities and instruct in home and community integration to attain remaining goals.    Progress toward goals / Updated goals:  ltg #2 achieved        PLAN  []  Upgrade activities as tolerated yes Continue plan of care   []  Discharge due to :    []  Other:      Therapist: Shweta Chirinos PT    Date: 6/14/2018 Time: 9:13 AM     Future Appointments  Date Time Provider Yariel Ricci   6/19/2018 12:00 PM Shweta Chirinos, PT [facility]   6/21/2018 2:00 PM Shweta Chirinos, PT [facility]   6/26/2018 10:30 AM Shweta Chirinos, PT [facility]   6/28/2018 10:30 AM Shweta Chirinos, PT [facility]

## 2018-06-19 ENCOUNTER — HOSPITAL ENCOUNTER (OUTPATIENT)
Dept: PHYSICAL THERAPY | Age: 73
Discharge: HOME | End: 2018-06-19
Payer: MEDICARE

## 2018-06-19 PROCEDURE — 97140 MANUAL THERAPY 1/> REGIONS: CPT | Performed by: PHYSICAL THERAPIST

## 2018-06-19 PROCEDURE — 97110 THERAPEUTIC EXERCISES: CPT | Performed by: PHYSICAL THERAPIST

## 2018-06-19 NOTE — PROGRESS NOTES
Veronica George PHYSICAL THERAPY - DAILY TREATMENT NOTE    Patient Name: Edwin Terrell        Date: 2018  : 1945   yes Patient  Verified  Visit #:   15  of   18 Insurance: Payor: Beto Barajas / Plan: VA MEDICARE PART A & B / Product Type: Medicare /      In time: 5840 Out time: 86   Total Treatment Time: 46     Medicare Time Tracking (below)   Total Timed Codes (min):52 1:1 Treatment Time:  52     TREATMENT AREA =  Low back pain [M54.5]    SUBJECTIVE  Pain Level (on 0 to 10 scale):  2 / 10   Medication Changes/New allergies or changes in medical history, any new surgeries or procedures? yes  If yes, update Summary List   Subjective Functional Status/Changes:  []  No changes reported     The only time my leg has bothered me is when I get up too quickly from sitting. OBJECTIVE      42 min Therapeutic Exercise:  [x]  See flow sheet   Rationale:      increase ROM and increase strength to improve the patients ability to complete adls     10 min Manual Therapy: stm ls paraspinals, grade ii-iii pa mobs l3-5, B hip ir/er and rf stretch   Rationale:      decrease pain, increase ROM, increase tissue extensibility and decrease trigger points to improve patient's ability to complete adls         min Patient Education:  yes  Reviewed HEP   []  Progressed/Changed HEP based on:        Other Objective/Functional Measures:  Denied any pain with hip prom and minimal muscular or joint restrictions detected with mt to l/s  Progressed standing core stab te as per flow sheet to improve wb adls    Post Treatment Pain Level (on 0 to 10) scale:   2/ 10     ASSESSMENT  Assessment/Changes in Function:   No increase in pain following exercise progression   []  See Progress Note/Recertification   Patient will continue to benefit from skilled PT services to modify and progress therapeutic interventions, address functional mobility deficits, address ROM deficits, address strength deficits, analyze and address soft tissue restrictions, analyze and cue movement patterns, analyze and modify body mechanics/ergonomics, assess and modify postural abnormalities and instruct in home and community integration to attain remaining goals.    Progress toward goals / Updated goals:    FOTO system down so unable to perform functional scales but pt does note a +5 on Global rate of change scale      PLAN  []  Upgrade activities as tolerated yes Continue plan of care   []  Discharge due to :    []  Other:      Therapist: Nasima Yost PT    Date: 6/19/2018 Time: 9:13 AM     Future Appointments  Date Time Provider Yariel Ricci   6/19/2018 12:00 PM Nasima Yost, PT [facility]   6/21/2018 2:00 PM Nasima Yost, PT [facility]   6/26/2018 10:30 AM Nasima Yost PT [facility]   6/28/2018 10:30 AM Nasima Yost, PT [address] [facility]

## 2018-06-21 ENCOUNTER — HOSPITAL ENCOUNTER (OUTPATIENT)
Dept: PHYSICAL THERAPY | Age: 73
Discharge: HOME | End: 2018-06-21
Payer: MEDICARE

## 2018-06-21 PROCEDURE — 97140 MANUAL THERAPY 1/> REGIONS: CPT | Performed by: PHYSICAL THERAPIST

## 2018-06-21 PROCEDURE — 97110 THERAPEUTIC EXERCISES: CPT | Performed by: PHYSICAL THERAPIST

## 2018-06-21 NOTE — PROGRESS NOTES
[facility] - DAILY TREATMENT NOTE    Patient Name: Mariaa Joiner        Date: 2018  : 1945   yes Patient  Verified  Visit #:   15  of   18 Insurance: Payor: Doreen Quezada / Plan: [place] / Product Type: Medicare /      In time: 200 Out time: 245   Total Treatment Time: 45     Medicare Time Tracking (below)   Total Timed Codes (min):45 1:1 Treatment Time:  45     TREATMENT AREA =  Low back pain [M54.5]    SUBJECTIVE  Pain Level (on 0 to 10 scale):  2 / 10   Medication Changes/New allergies or changes in medical history, any new surgeries or procedures? yes  If yes, update Summary List   Subjective Functional Status/Changes:  []  No changes reported     I was able to walk up the stairs without any pain. I was not able to do that before therapy     OBJECTIVE      35 min Therapeutic Exercise:  [x]  See flow sheet   Rationale:      increase ROM and increase strength to improve the patients ability to complete adls     10 min Manual Therapy: stm ls paraspinals, grade ii-iii pa mobs l3-5, B hip ir/er and rf stretch   Rationale:      decrease pain, increase ROM, increase tissue extensibility and decrease trigger points to improve patient's ability to complete adls         min Patient Education:  yes  Reviewed HEP   []  Progressed/Changed HEP based on:        Other Objective/Functional Measures:  ttp along l3 spinal level otherwise unremarkable findings with PT  Required min cues for te    Post Treatment Pain Level (on 0 to 10) scale:   / 10     ASSESSMENT  Assessment/Changes in Function:   No increase in pain following exercise progression   []  See Progress Note/Recertification   Patient will continue to benefit from skilled PT services to modify and progress therapeutic interventions, address functional mobility deficits, address ROM deficits, address strength deficits, analyze and address soft tissue restrictions, analyze and cue movement patterns, analyze and modify body mechanics/ergonomics, assess and modify postural abnormalities and instruct in home and community integration to attain remaining goals. Progress toward goals / Updated goals:    Anticipate discharge next week due to maximum gains with PT achieved.        PLAN  []  Upgrade activities as tolerated yes Continue plan of care   []  Discharge due to :    []  Other:      Therapist: Gin Martin PT    Date: 6/21/2018 Time: 9:13 AM     Future Appointments  Date Time Provider Yariel Ricci   6/26/2018 10:30 AM Gin Martin PT [facility]   6/28/2018 10:30 AM Gin Martin PT [id number] [facility]

## 2018-06-26 ENCOUNTER — HOSPITAL ENCOUNTER (OUTPATIENT)
Dept: PHYSICAL THERAPY | Age: 73
Discharge: HOME | End: 2018-06-26
Payer: MEDICARE

## 2018-06-26 PROCEDURE — 97110 THERAPEUTIC EXERCISES: CPT | Performed by: PHYSICAL THERAPIST

## 2018-06-26 PROCEDURE — 97140 MANUAL THERAPY 1/> REGIONS: CPT | Performed by: PHYSICAL THERAPIST

## 2018-06-26 NOTE — PROGRESS NOTES
[facility] - DAILY TREATMENT NOTE    Patient Name: Haley Lai        Date: 2018  : 1945   yes Patient  Verified  Visit #:   15  of   18 Insurance: Payor: Kofi Paredes / Plan: [place] MED[place]ARE PART A & B / Product Type: Medicare /      In time: 7502 Out time: 1120   Total Treatment Time: 50     Medicare Time Tracking (below)   Total Timed Codes (min):50 1:1 Treatment Time:  50     TREATMENT AREA =  Low back pain [M54.5]    SUBJECTIVE  Pain Level (on 0 to 10 scale):  2 / 10   Medication Changes/New allergies or changes in medical history, any new surgeries or procedures? yes  If yes, update Summary List   Subjective Functional Status/Changes:  []  No changes reported     Woke up in the middle of the night last night with pain. It was hard for me to go away. Just sore going up the stairs      OBJECTIVE      35 min Therapeutic Exercise:  [x]  See flow sheet   Rationale:      increase ROM and increase strength to improve the patients ability to complete adls     15 min Manual Therapy: stm ls paraspinals, grade ii-iii pa mobs l3-5, B hip ir/er and rf stretch   Rationale:      decrease pain, increase ROM, increase tissue extensibility and decrease trigger points to improve patient's ability to complete adls         min Patient Education:  yes  Reviewed HEP   []  Progressed/Changed HEP based on: Other Objective/Functional Measures:  ls arom wnl and repeated testing having no change, hip arom wnl all directions without pain reported end range  Added in gm stretching    Post Treatment Pain Level (on 0 to 10) scale:   1/ 10     ASSESSMENT  Assessment/Changes in Function:   Noted relief following session.  Pt was educated on exercise and activity modification following exacerbations of sx    []  See Progress Note/Recertification   Patient will continue to benefit from skilled PT services to modify and progress therapeutic interventions, address functional mobility deficits, address ROM deficits, address strength deficits, analyze and address soft tissue restrictions, analyze and cue movement patterns, analyze and modify body mechanics/ergonomics, assess and modify postural abnormalities and instruct in home and community integration to attain remaining goals.    Progress toward goals / Updated goals:    D/c next visit due to maximum gain with PT achieved      PLAN  []  Upgrade activities as tolerated yes Continue plan of care   []  Discharge due to :    []  Other:      Therapist: Raffaele Sousa PT    Date: 6/26/2018 Time: 9:13 AM     Future Appointments  Date Time Provider Yariel Ricci   6/28/2018 10:30 AM Raffaele Sousa, PT [facility] [facility]

## 2018-06-28 ENCOUNTER — HOSPITAL ENCOUNTER (OUTPATIENT)
Dept: PHYSICAL THERAPY | Age: 73
Discharge: HOME | End: 2018-06-28
Payer: MEDICARE

## 2018-06-28 PROCEDURE — G8980 MOBILITY D/C STATUS: HCPCS | Performed by: PHYSICAL THERAPIST

## 2018-06-28 PROCEDURE — G8979 MOBILITY GOAL STATUS: HCPCS | Performed by: PHYSICAL THERAPIST

## 2018-06-28 PROCEDURE — 97140 MANUAL THERAPY 1/> REGIONS: CPT | Performed by: PHYSICAL THERAPIST

## 2018-06-28 PROCEDURE — 97110 THERAPEUTIC EXERCISES: CPT | Performed by: PHYSICAL THERAPIST

## 2018-06-28 NOTE — PROGRESS NOTES
[facility] PHYSICAL THERAP[facility] - DAILY TREATMENT NOTE    Patient Name: Juju Milan        Date: 2018  : 1945   yes Patient  Verified  Visit #:    Insurance: Payor: Mahogany Mendosa / Plan: [place] MED[place]ARE PART A & B / Product Type: Medicare /      In time:  Out time: 112   Total Treatment Time: 50     Medicare Time Tracking (below)   Total Timed Codes (min):50 1:1 Treatment Time:  25     TREATMENT AREA =  Low back pain [M54.5]    SUBJECTIVE  Pain Level (on 0 to 10 scale):  1 / 10   Medication Changes/New allergies or changes in medical history, any new surgeries or procedures? yes  If yes, update Summary List   Subjective Functional Status/Changes:  []  No changes reported     See dc      OBJECTIVE      35 min Therapeutic Exercise:  [x]  See flow sheet   Rationale:      increase ROM and increase strength to improve the patients ability to complete adls     15 min Manual Therapy: stm ls paraspinals, grade ii-iii pa mobs l3-5, B hip ir/er and rf stretch   Rationale:      decrease pain, increase ROM, increase tissue extensibility and decrease trigger points to improve patient's ability to complete adls         min Patient Education:  yes  Reviewed HEP   []  Progressed/Changed HEP based on: Other Objective/Functional Measures:  See dc     Post Treatment Pain Level (on 0 to 10) scale:    10     ASSESSMENT  Assessment/Changes in Function:   See dc   []  See Progress Note/Recertification   Patient will continue to benefit from skilled PT services to see dc   Progress toward goals / Updated goals:    See dc     PLAN  []  Upgrade activities as tolerated no Continue plan of care   []  Discharge due to :    []  Other:      Therapist: Abril Fuentes, PT    Date: 2018 Time: 9:13 AM     No future appointments.

## 2025-07-30 NOTE — PROGRESS NOTES
.[facility] [address] [facility] THERAPY 
 [facility] [address] [place] [address],[facility], [address] [facility] - Phone: [phone]  Fax: [phone] DISCHARGE [facility] FOR PHYSICAL THERAPY Patient Name: Edra Aase : 1945 Treatment/Medical Diagnosis: Low back pain [M54.5] Onset Date: 2018 Referral Source: Faizan Douglass, * Start of Care [facility]): 18 Prior Hospitalization: See Medical History Provider #: [id number] Prior Level of Function: No limitations related to back Comorbidities: Arthritis, high blood pressure, scoliosis, thyroid problems Medications: Verified on Patient Summary List  
Visits from General acute hospital[facility]: 16 Missed Visits: 0 Goal/Measure of Progress Goal Met? 1. Pt will increase FOTO by 16 points in order to show functional improvement Status at last Eval: 58/100 Current Status: 58/100 no 2. Pt will ambulate community distances with symmetrical gait pattern Status at last Eval: trendelenburg Current Status:  yes 3. Pt will be able to self manage sx in order to prepare for dc Status at last Eval:  Current Status:  yes Key Functional Changes/Progress: pt achieve 2/3 LTG and reached maximum gains from PT. At this time she is able to complete exercise  Program at home G-Codes (GP): Georgi Kraft .Mobility  [id number] Goal  CJ= 20-39%  D/C  CK= 40-59%. The severity rating is based on the FOTO Score Assessments/Recommendations: Discontinue therapy. Progressing towards or have reached established goals. If you have any questions/comments please contact us directly at [phone]. Thank you for allowing us to assist in the care of your patient. Therapist Signature: Gael Hamm PT Date: 2818 Reporting Period: 18-18 Time: 2:53 PM  
 
 
 Last 1/13/2025  Next 9/15/2025